# Patient Record
Sex: FEMALE | Race: WHITE | NOT HISPANIC OR LATINO | Employment: FULL TIME | URBAN - METROPOLITAN AREA
[De-identification: names, ages, dates, MRNs, and addresses within clinical notes are randomized per-mention and may not be internally consistent; named-entity substitution may affect disease eponyms.]

---

## 2022-10-03 ENCOUNTER — HOSPITAL ENCOUNTER (EMERGENCY)
Facility: HOSPITAL | Age: 42
Discharge: HOME | End: 2022-10-03
Attending: EMERGENCY MEDICINE
Payer: MEDICAID

## 2022-10-03 VITALS
DIASTOLIC BLOOD PRESSURE: 98 MMHG | RESPIRATION RATE: 16 BRPM | HEIGHT: 67 IN | SYSTOLIC BLOOD PRESSURE: 165 MMHG | HEART RATE: 105 BPM | BODY MASS INDEX: 29.03 KG/M2 | TEMPERATURE: 99 F | OXYGEN SATURATION: 99 % | WEIGHT: 185 LBS

## 2022-10-03 DIAGNOSIS — B30.9 ACUTE VIRAL CONJUNCTIVITIS OF BOTH EYES: Primary | ICD-10-CM

## 2022-10-03 PROCEDURE — 99283 EMERGENCY DEPT VISIT LOW MDM: CPT

## 2022-10-03 RX ORDER — ERYTHROMYCIN 5 MG/G
OINTMENT OPHTHALMIC
Qty: 3.5 G | Refills: 0 | Status: SHIPPED | OUTPATIENT
Start: 2022-10-03

## 2022-10-03 NOTE — Clinical Note
"Renate Gallegoseusebio Mcmanus was seen and treated in our emergency department on 10/3/2022.  She may return to work on 10/07/2022.       If you have any questions or concerns, please don't hesitate to call.      Edy Hopper MD"

## 2022-10-04 NOTE — ED PROVIDER NOTES
Encounter Date: 10/3/2022       History     Chief Complaint   Patient presents with    Eye Problem     Itchy eyes min. Reddness wearing fake eye lashes     Patient is a 42-year-old female presenting with complaint of bilateral eye irritation and redness that started yesterday.  Patient states she was exposed to pinkeye.  Patient denies any other complaints.  Patient states she needs a work excuse.  No visual changes.  No headache.    Review of patient's allergies indicates:   Allergen Reactions    Penicillins Swelling     No past medical history on file.  No past surgical history on file.  No family history on file.     Review of Systems   Constitutional: Negative.    HENT: Negative.     Eyes:  Positive for discharge, redness and itching. Negative for photophobia and visual disturbance.   Respiratory: Negative.     Cardiovascular: Negative.    Gastrointestinal: Negative.    Musculoskeletal: Negative.    Neurological: Negative.    Psychiatric/Behavioral: Negative.       Physical Exam     Initial Vitals [10/03/22 2023]   BP Pulse Resp Temp SpO2   (!) 165/98 105 16 98.5 °F (36.9 °C) 99 %      MAP       --         Physical Exam    Nursing note and vitals reviewed.  Constitutional: She appears well-developed and well-nourished.   HENT:   Head: Normocephalic.   Eyes:   Very mild conjunctival injection bilaterally.  No purulent discharge seen.  Pupil is round and reactive bilaterally.  No foreign body seen.   Cardiovascular:  Normal rate, regular rhythm and normal heart sounds.           Pulmonary/Chest: Breath sounds normal.   Musculoskeletal:         General: Normal range of motion.     Psychiatric: She has a normal mood and affect. Thought content normal.       ED Course   Procedures  Labs Reviewed - No data to display       Imaging Results    None          Medications - No data to display                           Clinical Impression:   Final diagnoses:  [B30.9] Acute viral conjunctivitis of both eyes (Primary)      ED  Disposition Condition    Discharge Stable          ED Prescriptions       Medication Sig Dispense Start Date End Date Auth. Provider    erythromycin (ROMYCIN) ophthalmic ointment Place a 1/2 inch ribbon of ointment into the lower eyelid. 3.5 g 10/3/2022 -- Edy Hopper MD          Follow-up Information       Follow up With Specialties Details Why Contact Info    [facility] - Emergency Dept Emergency Medicine  As needed [address] [place] [address] [id number] [phone]             Edy Hopper MD  10/03/22 2032

## 2022-12-06 ENCOUNTER — HOSPITAL ENCOUNTER (EMERGENCY)
Facility: HOSPITAL | Age: 42
Discharge: HOME | End: 2022-12-06
Attending: STUDENT IN AN ORGANIZED HEALTH CARE EDUCATION/TRAINING PROGRAM
Payer: MEDICAID

## 2022-12-06 VITALS
HEART RATE: 97 BPM | HEIGHT: 61 IN | TEMPERATURE: 98 F | BODY MASS INDEX: 32.1 KG/M2 | SYSTOLIC BLOOD PRESSURE: 138 MMHG | OXYGEN SATURATION: 98 % | RESPIRATION RATE: 20 BRPM | WEIGHT: 170 LBS | DIASTOLIC BLOOD PRESSURE: 89 MMHG

## 2022-12-06 DIAGNOSIS — L30.9 DERMATITIS: ICD-10-CM

## 2022-12-06 DIAGNOSIS — R21 RASH: Primary | ICD-10-CM

## 2022-12-06 LAB — B-HCG SERPL QL: NEGATIVE

## 2022-12-06 PROCEDURE — 81025 URINE PREGNANCY TEST: CPT | Performed by: STUDENT IN AN ORGANIZED HEALTH CARE EDUCATION/TRAINING PROGRAM

## 2022-12-06 PROCEDURE — 99283 EMERGENCY DEPT VISIT LOW MDM: CPT

## 2022-12-06 RX ORDER — TRIAMCINOLONE ACETONIDE 1 MG/G
CREAM TOPICAL 2 TIMES DAILY
Qty: 80 G | Refills: 0 | Status: SHIPPED | OUTPATIENT
Start: 2022-12-06

## 2022-12-06 NOTE — DISCHARGE INSTRUCTIONS
You were seen in the emergency department for your rash.  This rash is likely caused by dermatitis, or skin irritation.  Please follow-up with your primary care physician as soon as possible.  Please take all medications as prescribed.  Please return immediately to emergency department if you experience fevers above 100.4° F, fainting, seizures, chest pain, shortness of breath, uncontrollable nausea and vomiting, significantly worsening rash, skin peeling, or inability to walk.

## 2022-12-06 NOTE — ED PROVIDER NOTES
Encounter Date: 12/6/2022       History     Chief Complaint   Patient presents with    Rash     Rash noted to lower half of face and neck started 3 days ago     Patient is a pleasant 42-year-old female with no significant past medical history who presents to emergency department with rash.  History primarily obtained from patient at bedside.  Patient states she noticed some redness and itchiness to her face and neck that started 3 days ago.  Patient states it was slightly progressed since then.  Patient states she is taking Benadryl at home with no significant relief of symptoms.  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  Patient states she does not take any daily medications, and has not changed any soaps or detergents recently.  Patient denies any fevers, skin peeling, neck pain, difficulty swallowing, shortness of breath, chest pain, or inability to walk.      Review of patient's allergies indicates:   Allergen Reactions    Penicillins Swelling     History reviewed. No pertinent past medical history.  History reviewed. No pertinent surgical history.  History reviewed. No pertinent family history.  Social History     Tobacco Use    Smoking status: Never    Smokeless tobacco: Never   Substance Use Topics    Alcohol use: Never    Drug use: Never     Review of Systems   Constitutional:  Negative for fever.   HENT:  Negative for sore throat.    Respiratory:  Negative for shortness of breath.    Cardiovascular:  Negative for chest pain.   Gastrointestinal:  Negative for nausea.   Genitourinary:  Negative for dysuria.   Musculoskeletal:  Negative for back pain.   Skin:  Positive for rash.   Neurological:  Negative for weakness.   Hematological:  Does not bruise/bleed easily.     Physical Exam     Initial Vitals [12/06/22 1525]   BP Pulse Resp Temp SpO2   (!) 157/101 97 20 97.9 °F (36.6 °C) 97 %      MAP       --         Physical Exam    Constitutional: She appears well-developed and  well-nourished. She is not diaphoretic. No distress.   HENT:   Head: Normocephalic and atraumatic.   Mouth/Throat: Oropharynx is clear and moist. No oropharyngeal exudate.   Eyes: Conjunctivae and EOM are normal. Pupils are equal, round, and reactive to light. Right eye exhibits no discharge. Left eye exhibits no discharge.   Neck: Neck supple.   Normal range of motion.  Cardiovascular:  Normal rate, regular rhythm, normal heart sounds and intact distal pulses.           Pulmonary/Chest: Breath sounds normal. She exhibits no tenderness.   Abdominal: Abdomen is soft. She exhibits no distension. There is no abdominal tenderness.   Musculoskeletal:         General: No tenderness or edema. Normal range of motion.      Cervical back: Normal range of motion and neck supple.     Neurological: She is alert and oriented to person, place, and time. She has normal strength. No cranial nerve deficit or sensory deficit.   Skin: Skin is warm and dry. Capillary refill takes less than 2 seconds. Rash noted. There is erythema.   Mildly erythematous rash to chin, nasal bridge, and submandibular skin.  Scattered non purulent pustules over rash.  No skin peeling.  No significant tenderness to palpation over rash. No rashes or petechiae noted to back, chest, abdomen, or other extremities        ED Course   Procedures  Labs Reviewed   PREGNANCY TEST, URINE RAPID - Normal          Imaging Results    None          Medications - No data to display  Medical Decision Making:   Initial Assessment:   42-year-old female with no significant past medical history who presents to emergency department with rash.  Initial physical exam significant for well-appearing, nontoxic appearing patient with erythematous rash noted to chin and nasal bridge with no skin peeling or significant tenderness to palpation.  Differential Diagnosis:   Treviño Carrillo syndrome, TEN, dermatitis, allergic reaction, HSV  Clinical Tests:   Lab Tests: Ordered and  Reviewed  The following lab test(s) were unremarkable: UPT  ED Management:  Patient presentation appears consistent with dermatitis. Suspect contact dermatitis secondary to mask usage, patient's rash distribution appears to be along typical facial mask borders. No other rashes noted. Patient is very well appearing with no skin peeling, no recent medication or soap changes. No evidence of any bacterial infection or vesicles consistent with HSV infection. Patient is stable for discharge to home. Patient counseled on follow up with primary care physician. Patient counseled on strict return precautions. Patient counseled on symptom control at home, received prescription for Triamcinolone cream. Patient updated on lab work, and is amenable with being discharged home.            ED Course as of 12/06/22 1821   Tue Dec 06, 2022   1553 Preg Test, Ur: Negative [SM]      ED Course User Index  [SM] Marcos Delgado MD                 Clinical Impression:   Final diagnoses:  [R21] Rash (Primary)  [L30.9] Dermatitis      ED Disposition Condition    Discharge Stable          ED Prescriptions       Medication Sig Dispense Start Date End Date Auth. Provider    triamcinolone acetonide 0.1% (KENALOG) 0.1 % cream Apply topically 2 (two) times daily. Apply to face and neck 80 g 12/6/2022 -- Marcos Delgado MD          Follow-up Information       Follow up With Specialties Details Why Contact Taran Martinez DO Family Medicine, Hospitalist Schedule an appointment as soon as possible for a visit in 2 days  [address] [facility] [address]  [phone]               Marcos Delgado MD  12/06/22 1821

## 2023-01-19 ENCOUNTER — HOSPITAL ENCOUNTER (EMERGENCY)
Facility: HOSPITAL | Age: 43
Discharge: HOME | End: 2023-01-19
Attending: GENERAL PRACTICE
Payer: MEDICAID

## 2023-01-19 VITALS
WEIGHT: 165 LBS | HEIGHT: 61 IN | OXYGEN SATURATION: 99 % | RESPIRATION RATE: 20 BRPM | BODY MASS INDEX: 31.15 KG/M2 | HEART RATE: 81 BPM | DIASTOLIC BLOOD PRESSURE: 68 MMHG | SYSTOLIC BLOOD PRESSURE: 118 MMHG | TEMPERATURE: 98 F

## 2023-01-19 DIAGNOSIS — F15.10 AMPHETAMINE ABUSE: Primary | ICD-10-CM

## 2023-01-19 DIAGNOSIS — F12.188 CANNABIS ABUSE WITH OTHER CANNABIS-INDUCED DISORDER: ICD-10-CM

## 2023-01-19 DIAGNOSIS — R11.2 NAUSEA & VOMITING: ICD-10-CM

## 2023-01-19 DIAGNOSIS — K52.9 GASTROENTERITIS: ICD-10-CM

## 2023-01-19 LAB
ALBUMIN SERPL-MCNC: 3.5 G/DL (ref 3.5–5)
ALBUMIN/GLOB SERPL: 1.1 RATIO (ref 1.1–2)
ALP SERPL-CCNC: 103 UNIT/L (ref 40–150)
ALT SERPL-CCNC: 10 UNIT/L (ref 0–55)
AMPHET UR QL SCN: POSITIVE
AST SERPL-CCNC: 13 UNIT/L (ref 5–34)
B-HCG SERPL QL: NEGATIVE
BARBITURATE SCN PRESENT UR: NEGATIVE
BASOPHILS # BLD AUTO: 0.04 X10(3)/MCL (ref 0–0.2)
BASOPHILS NFR BLD AUTO: 0.4 %
BENZODIAZ UR QL SCN: NEGATIVE
BILIRUBIN DIRECT+TOT PNL SERPL-MCNC: 0.4 MG/DL
BUN SERPL-MCNC: 15 MG/DL (ref 7–18.7)
CALCIUM SERPL-MCNC: 9.1 MG/DL (ref 8.4–10.2)
CANNABINOIDS UR QL SCN: POSITIVE
CHLORIDE SERPL-SCNC: 109 MMOL/L (ref 98–107)
CO2 SERPL-SCNC: 24 MMOL/L (ref 22–29)
COCAINE UR QL SCN: NEGATIVE
CREAT SERPL-MCNC: 0.78 MG/DL (ref 0.55–1.02)
EOSINOPHIL # BLD AUTO: 0.26 X10(3)/MCL (ref 0–0.9)
EOSINOPHIL NFR BLD AUTO: 2.8 %
ERYTHROCYTE [DISTWIDTH] IN BLOOD BY AUTOMATED COUNT: 13.1 % (ref 11.5–17)
FENTANYL UR QL SCN: NEGATIVE
GFR SERPLBLD CREATININE-BSD FMLA CKD-EPI: >60 MLS/MIN/1.73/M2
GLOBULIN SER-MCNC: 3.3 GM/DL (ref 2.4–3.5)
GLUCOSE SERPL-MCNC: 125 MG/DL (ref 74–100)
HCT VFR BLD AUTO: 43 % (ref 37–47)
HGB BLD-MCNC: 14.3 GM/DL (ref 12–16)
IMM GRANULOCYTES # BLD AUTO: 0.01 X10(3)/MCL (ref 0–0.04)
IMM GRANULOCYTES NFR BLD AUTO: 0.1 %
LYMPHOCYTES # BLD AUTO: 2.06 X10(3)/MCL (ref 0.6–4.6)
LYMPHOCYTES NFR BLD AUTO: 22.5 %
MCH RBC QN AUTO: 30 PG
MCHC RBC AUTO-ENTMCNC: 33.3 MG/DL (ref 33–36)
MCV RBC AUTO: 90.3 FL (ref 80–94)
MDMA UR QL SCN: NEGATIVE
MONOCYTES # BLD AUTO: 0.72 X10(3)/MCL (ref 0.1–1.3)
MONOCYTES NFR BLD AUTO: 7.9 %
NEUTROPHILS # BLD AUTO: 6.06 X10(3)/MCL (ref 2.1–9.2)
NEUTROPHILS NFR BLD AUTO: 66.3 %
NRBC BLD AUTO-RTO: 0 %
OPIATES UR QL SCN: NEGATIVE
PCP UR QL: NEGATIVE
PH UR: 5.5 [PH] (ref 3–11)
PLATELET # BLD AUTO: 315 X10(3)/MCL (ref 130–400)
PMV BLD AUTO: 9.3 FL (ref 7.4–10.4)
POTASSIUM SERPL-SCNC: 3.7 MMOL/L (ref 3.5–5.1)
PROT SERPL-MCNC: 6.8 GM/DL (ref 6.4–8.3)
RBC # BLD AUTO: 4.76 X10(6)/MCL (ref 4.2–5.4)
SODIUM SERPL-SCNC: 143 MMOL/L (ref 136–145)
WBC # SPEC AUTO: 9.2 X10(3)/MCL (ref 4.5–11.5)

## 2023-01-19 PROCEDURE — 85025 COMPLETE CBC W/AUTO DIFF WBC: CPT | Performed by: GENERAL PRACTICE

## 2023-01-19 PROCEDURE — 25000003 PHARM REV CODE 250: Performed by: GENERAL PRACTICE

## 2023-01-19 PROCEDURE — 81025 URINE PREGNANCY TEST: CPT | Performed by: GENERAL PRACTICE

## 2023-01-19 PROCEDURE — 80307 DRUG TEST PRSMV CHEM ANLYZR: CPT | Performed by: GENERAL PRACTICE

## 2023-01-19 PROCEDURE — 99284 EMERGENCY DEPT VISIT MOD MDM: CPT | Mod: 25

## 2023-01-19 PROCEDURE — 80053 COMPREHEN METABOLIC PANEL: CPT | Performed by: GENERAL PRACTICE

## 2023-01-19 RX ORDER — ONDANSETRON 4 MG/1
4 TABLET, FILM COATED ORAL EVERY 6 HOURS
Qty: 12 TABLET | Refills: 0 | Status: SHIPPED | OUTPATIENT
Start: 2023-01-19

## 2023-01-19 RX ORDER — ONDANSETRON 4 MG/1
4 TABLET, ORALLY DISINTEGRATING ORAL
Status: COMPLETED | OUTPATIENT
Start: 2023-01-19 | End: 2023-01-19

## 2023-01-19 RX ADMIN — ONDANSETRON 4 MG: 4 TABLET, ORALLY DISINTEGRATING ORAL at 11:01

## 2023-01-19 NOTE — Clinical Note
"Renate Gallegosa" Yonathan was seen and treated in our emergency department on 1/19/2023.  She may return to work on 01/19/2023.  Seen by Dr. Hayes on 1/19/2023 at [phone]     If you have any questions or concerns, please don't hesitate to call.      Albert DE LA CRUZ    "

## 2023-01-19 NOTE — ED PROVIDER NOTES
Encounter Date: 1/19/2023       History     Chief Complaint   Patient presents with    Vomiting    Nausea    Diarrhea     Nausea, vomiting, diarrhea x 1 day.       Nausea and vomiting for 1 day    The history is provided by the patient.   Vomiting   This is a new problem. The current episode started today. The problem occurs intermittently. The problem has been waxing and waning. The emesis has an appearance of stomach contents. Associated symptoms include diarrhea.   Nausea  This is a new problem. The current episode started 6 to 12 hours ago. The problem occurs constantly. The problem has not changed since onset.Nothing aggravates the symptoms.   Diarrhea   Associated symptoms include vomiting.   Review of patient's allergies indicates:   Allergen Reactions    Penicillins Swelling     No past medical history on file.  No past surgical history on file.  No family history on file.  Social History     Tobacco Use    Smoking status: Never    Smokeless tobacco: Never   Substance Use Topics    Alcohol use: Never    Drug use: Never     Review of Systems   Constitutional: Negative.    HENT: Negative.     Eyes: Negative.    Respiratory: Negative.     Cardiovascular: Negative.    Gastrointestinal:  Positive for diarrhea, nausea and vomiting.   Endocrine: Negative.    Genitourinary: Negative.    Musculoskeletal: Negative.    Skin: Negative.    Allergic/Immunologic: Negative.    Neurological: Negative.    Hematological: Negative.    Psychiatric/Behavioral: Negative.     All other systems reviewed and are negative.    Physical Exam     Initial Vitals [01/19/23 1149]   BP Pulse Resp Temp SpO2   (!) 137/99 83 20 97.7 °F (36.5 °C) 99 %      MAP       --         Physical Exam    Nursing note and vitals reviewed.  Constitutional: She appears well-developed and well-nourished.   HENT:   Head: Normocephalic and atraumatic.   Eyes: EOM are normal. Pupils are equal, round, and reactive to light.   Neck: Neck supple.   Normal range of  motion.  Cardiovascular:  Normal rate, regular rhythm, normal heart sounds and intact distal pulses.           Pulmonary/Chest: Breath sounds normal.   Abdominal: Abdomen is soft. Bowel sounds are normal.   Musculoskeletal:         General: Normal range of motion.      Cervical back: Normal range of motion and neck supple.     Neurological: She is alert and oriented to person, place, and time. She has normal strength. GCS score is 15. GCS eye subscore is 4. GCS verbal subscore is 5. GCS motor subscore is 6.   Skin: Skin is warm and dry.   Psychiatric: She has a normal mood and affect. Her behavior is normal. Judgment and thought content normal.       ED Course   Procedures  Labs Reviewed   DRUG SCREEN, URINE (BEAKER) - Abnormal; Notable for the following components:       Result Value    Amphetamines, Urine Positive (*)     Cannabinoids, Urine Positive (*)     All other components within normal limits    Narrative:     Cut off concentrations:    Amphetamines - 1000 ng/ml  Barbiturates - 200 ng/ml  Benzodiazepine - 200 ng/ml  Cannabinoids (THC) - 50 ng/ml  Cocaine - 300 ng/ml  Fentanyl - 1.0 ng/ml  MDMA - 500 ng/ml  Opiates - 300 ng/ml   Phencyclidine (PCP) - 25 ng/ml    Specimen submitted for drug analysis and tested for pH and specific gravity in order to evaluate sample integrity. Suspect tampering if specific gravity is <1.003 and/or pH is not within the range of 4.5 - 8.0  False negatives may result form substances such as bleach added to urine.  False positives may result for the presence of a substance with similar chemical structure to the drug or its metabolite.    This test provides only a PRELIMINARY analytical test result. A more specific alternate chemical method must be used in order to obtain a confirmed analytical result. Gas chromatography/mass spectrometry (GC/MS) is the preferred confirmatory method. Other chemical confirmation methods are available. Clinical consideration and professional  judgement should be applied to any drug of abuse test result, particularly when preliminary positive results are used.    Positive results will be confirmed only at the physicians request. Unconfirmed screening results are to be used only for medical purposes (treatment).        COMPREHENSIVE METABOLIC PANEL - Abnormal; Notable for the following components:    Chloride 109 (*)     Glucose Level 125 (*)     All other components within normal limits   PREGNANCY TEST, URINE RAPID - Normal   CBC W/ AUTO DIFFERENTIAL    Narrative:     The following orders were created for panel order CBC auto differential.  Procedure                               Abnormality         Status                     ---------                               -----------         ------                     CBC with Differential[[id number]]                            Final result                 Please view results for these tests on the individual orders.   CBC WITH DIFFERENTIAL          Imaging Results              X-Ray Abdomen Flat And Erect (Final result)  Result time 01/19/23 12:46:31      Final result by Rebel Stacy MD (01/19/23 12:46:31)                   Impression:      Nonobstructive bowel gas pattern.      Electronically signed by: Rebel Stacy  Date:    01/19/2023  Time:    12:46               Narrative:    EXAMINATION:  XR ABDOMEN FLAT AND ERECT    CLINICAL HISTORY:  Nausea with vomiting, unspecified    TECHNIQUE:  Flat and erect AP views of the abdomen.    COMPARISON:  CT 02/14/2021    FINDINGS:  Clear lung bases.  No dilated bowel, significant air-fluid levels or definitive findings for pneumoperitoneum.  Moderate volume stool.                                       Medications   ondansetron disintegrating tablet 4 mg (4 mg Oral Given 1/19/23 1148)     Medical Decision Making:   Clinical Tests:   Lab Tests: Ordered and Reviewed  Radiological Study: Ordered and Reviewed  ED Management:  Normal exam with normal workup.  Patient  tested positive for amphetamines and cannabis.  I advised the patient that over use of cannabis can result in vomiting.  Patient states that her grandchild is sick at home with similar symptoms.  Upon arrival to the emergency room and upon discharge, the patient asked for a work excuse.                        Clinical Impression:   Final diagnoses:  [R11.2] Nausea & vomiting  [F15.10] Amphetamine abuse (Primary)  [F12.188] Cannabis abuse with other cannabis-induced disorder  [K52.9] Gastroenteritis        ED Disposition Condition    Discharge Stable          ED Prescriptions       Medication Sig Dispense Start Date End Date Auth. Provider    ondansetron (ZOFRAN) 4 MG tablet Take 1 tablet (4 mg total) by mouth every 6 (six) hours. 12 tablet 1/19/2023 -- Manish Mo MD          Follow-up Information       Follow up With Specialties Details Why Contact Info    PCP  Schedule an appointment as soon as possible for a visit in 2 days As needed              Manish Mo MD  01/19/23 4763       Manish Mo MD  02/02/23 9237

## 2023-01-19 NOTE — Clinical Note
"Renate Gallegosa" Yonathan was seen and treated in our emergency department on 1/19/2023.  She may return to work on 01/19/2023.  Seen by Dr. Hayes on 1/19/2023 at [phone]     If you have any questions or concerns, please don't hesitate to call.      Albert DE LA CRUZ    "

## 2023-01-19 NOTE — Clinical Note
"Renate Dobbins" Ronniebarry was seen and treated in our emergency department on 1/19/2023.  She may return to school on 01/19/2023.      If you have any questions or concerns, please don't hesitate to call.       RN"

## 2023-01-19 NOTE — ED NOTES
Pt ambulated to ed rm 4 from [facility]. Aaox4. Reports nausea, vomiting, and diarrhea since yesterday. Pt denies any fever or abd pain. Pt reports a burning sensation to abd area. Pt is in no distress at this time. On monitors. Wctm

## 2024-03-20 ENCOUNTER — HOSPITAL ENCOUNTER (EMERGENCY)
Facility: HOSPITAL | Age: 44
Discharge: HOME | End: 2024-03-20
Attending: FAMILY MEDICINE
Payer: MEDICAID

## 2024-03-20 VITALS
RESPIRATION RATE: 20 BRPM | OXYGEN SATURATION: 97 % | SYSTOLIC BLOOD PRESSURE: 128 MMHG | HEART RATE: 84 BPM | HEIGHT: 62 IN | WEIGHT: 175 LBS | BODY MASS INDEX: 32.2 KG/M2 | DIASTOLIC BLOOD PRESSURE: 78 MMHG | TEMPERATURE: 97 F

## 2024-03-20 DIAGNOSIS — H10.33 ACUTE BACTERIAL CONJUNCTIVITIS OF BOTH EYES: Primary | ICD-10-CM

## 2024-03-20 PROCEDURE — 99283 EMERGENCY DEPT VISIT LOW MDM: CPT

## 2024-03-20 RX ORDER — GENTAMICIN SULFATE 3 MG/ML
2 SOLUTION/ DROPS OPHTHALMIC 4 TIMES DAILY
Qty: 15 ML | Refills: 0 | Status: SHIPPED | OUTPATIENT
Start: 2024-03-20 | End: 2024-03-25

## 2024-03-20 NOTE — Clinical Note
"Renate Gallegoseusebio Mcmanus was seen and treated in our emergency department on 3/20/2024.  She may return to work on 03/22/2024.       If you have any questions or concerns, please don't hesitate to call.      Noman Lerma MD"

## 2024-03-20 NOTE — ED PROVIDER NOTES
Encounter Date: 3/20/2024       History     Chief Complaint   Patient presents with    Conjunctivitis     Eye irritation to both eyes started yesterday, exposure to pink eye.     Patient presents to the emergency room with bilateral eyes with irritation.  Itchy, watery, red.    The history is provided by the patient.     Review of patient's allergies indicates:   Allergen Reactions    Penicillins Swelling     History reviewed. No pertinent past medical history.  History reviewed. No pertinent surgical history.  History reviewed. No pertinent family history.  Social History     Tobacco Use    Smoking status: Never    Smokeless tobacco: Never   Substance Use Topics    Alcohol use: Never    Drug use: Never     Review of Systems   Eyes:  Positive for redness and itching.   All other systems reviewed and are negative.      Physical Exam     Initial Vitals [03/20/24 1744]   BP Pulse Resp Temp SpO2   128/78 84 20 97 °F (36.1 °C) 97 %      MAP       --         Physical Exam    Nursing note and vitals reviewed.  Constitutional: She appears well-developed and well-nourished. No distress.   HENT:   Head: Normocephalic and atraumatic.   Eyes: EOM are normal. Pupils are equal, round, and reactive to light.   Bilateral conjunctival irritation and scant watery discharge noted           ED Course   Procedures  Labs Reviewed - No data to display       Imaging Results    None          Medications - No data to display  Medical Decision Making                                    Clinical Impression:  Final diagnoses:  [H10.33] Acute bacterial conjunctivitis of both eyes (Primary)          ED Disposition Condition    Discharge Stable          ED Prescriptions       Medication Sig Dispense Start Date End Date Auth. Provider    gentamicin (GARAMYCIN) 0.3 % ophthalmic solution Place 2 drops into both eyes 4 (four) times daily. for 5 days 15 mL 3/20/2024 3/25/2024 Noman Lerma MD          Follow-up Information       Follow up With  Specialties Details Why Contact Info    Your PCP  Call  As needed              Noman Lerma MD  03/20/24 0308

## 2025-05-17 ENCOUNTER — HOSPITAL ENCOUNTER (EMERGENCY)
Facility: HOSPITAL | Age: 45
Discharge: HOME | End: 2025-05-17
Attending: FAMILY MEDICINE
Payer: MEDICAID

## 2025-05-17 VITALS
HEART RATE: 88 BPM | BODY MASS INDEX: 33.79 KG/M2 | WEIGHT: 179 LBS | RESPIRATION RATE: 18 BRPM | DIASTOLIC BLOOD PRESSURE: 81 MMHG | SYSTOLIC BLOOD PRESSURE: 150 MMHG | OXYGEN SATURATION: 98 % | HEIGHT: 61 IN | TEMPERATURE: 98 F

## 2025-05-17 DIAGNOSIS — T14.90XA TRAUMA: ICD-10-CM

## 2025-05-17 DIAGNOSIS — S00.03XA CONTUSION OF SCALP, INITIAL ENCOUNTER: Primary | ICD-10-CM

## 2025-05-17 DIAGNOSIS — T07.XXXA MULTIPLE CONTUSIONS: ICD-10-CM

## 2025-05-17 PROCEDURE — 99284 EMERGENCY DEPT VISIT MOD MDM: CPT | Mod: 25

## 2025-05-17 PROCEDURE — 25000003 PHARM REV CODE 250: Performed by: FAMILY MEDICINE

## 2025-05-17 RX ORDER — HYDROCODONE BITARTRATE AND ACETAMINOPHEN 10; 325 MG/1; MG/1
1 TABLET ORAL
Refills: 0 | Status: COMPLETED | OUTPATIENT
Start: 2025-05-17 | End: 2025-05-17

## 2025-05-17 RX ORDER — HYDROCODONE BITARTRATE AND ACETAMINOPHEN 10; 325 MG/1; MG/1
1 TABLET ORAL EVERY 6 HOURS PRN
Qty: 10 TABLET | Refills: 0 | Status: SHIPPED | OUTPATIENT
Start: 2025-05-17

## 2025-05-17 RX ORDER — CLINDAMYCIN PHOSPHATE 900 MG/50ML
900 INJECTION, SOLUTION INTRAVENOUS
Status: DISCONTINUED | OUTPATIENT
Start: 2025-05-17 | End: 2025-05-17

## 2025-05-17 RX ADMIN — HYDROCODONE BITARTRATE AND ACETAMINOPHEN 1 TABLET: 10; 325 TABLET ORAL at 08:05

## 2025-05-18 NOTE — ED PROVIDER NOTES
"Encounter Date: 5/17/2025       History     Chief Complaint   Patient presents with    Arm Injury     C/o right arm and right knee pain with a posterior headache after wooden utility pole fell on pt while she was working in her yard approx 30 min pta. No redness nor deformities noted to areas. ROM intact. Pain to right arm and knee is a burning type pain 10/10 and headache pain 10/10. No meds taken for pain pta. AAOx4. Cap refill < 3. Pulses +2.     Patient presented to the emergency room after being struck by a small pole which fell on her right side.  She reports that it did hit her on the right side of her body, including her head chest right forearm and right knee.  She reports she possibly was knocked out" because she can remember calling for help while she was unconscious.  There was no nausea or vomiting.        Review of patient's allergies indicates:   Allergen Reactions    Penicillins Swelling     History reviewed. No pertinent past medical history.  Past Surgical History:   Procedure Laterality Date    TUBAL LIGATION       No family history on file.  Social History[1]  Review of Systems   Constitutional:  Negative for fever.   HENT:  Negative for sore throat.    Respiratory:  Negative for shortness of breath.    Cardiovascular:  Negative for chest pain.   Gastrointestinal:  Negative for nausea.   Genitourinary:  Negative for dysuria.   Musculoskeletal:  Negative for back pain.   Skin:  Negative for rash.   Neurological:  Negative for weakness.   Hematological:  Does not bruise/bleed easily.   All other systems reviewed and are negative.      Physical Exam     Initial Vitals [05/17/25 1849]   BP Pulse Resp Temp SpO2   (!) 150/81 88 16 97.8 °F (36.6 °C) 98 %      MAP       --         Physical Exam    Nursing note and vitals reviewed.  Constitutional: She appears well-developed and well-nourished. No distress.   HENT:   Head: Normocephalic. Mouth/Throat: No oropharyngeal exudate.   Mild tender right scalp, " no hematoma nor abrasion.   Eyes: Conjunctivae and EOM are normal. Pupils are equal, round, and reactive to light.   Neck: Neck supple.   Non tender.   Normal range of motion.  Cardiovascular:  Normal rate, regular rhythm and normal heart sounds.           Pulmonary/Chest: Breath sounds normal. No respiratory distress. She exhibits tenderness (Mild right upper chest wall tenderness).   Abdominal: Abdomen is soft. Bowel sounds are normal. There is no abdominal tenderness.   Musculoskeletal:         General: No edema. Normal range of motion.      Cervical back: Normal range of motion and neck supple.      Comments: Right forearm: mild tender, no abrasion, no edema full rom, distal cap refill <3 sec, sensation intact.    Right shoulder: non-tender, full ROM    Right knee: mild diffusely tender, no effusion, stable. No abrasions.     Lymphadenopathy:     She has no cervical adenopathy.   Neurological: She is alert and oriented to person, place, and time. She has normal strength and normal reflexes. She displays normal reflexes. No cranial nerve deficit or sensory deficit. GCS score is 15. GCS eye subscore is 4. GCS verbal subscore is 5. GCS motor subscore is 6.   Skin: Skin is warm and dry. Capillary refill takes less than 2 seconds.   Psychiatric: She has a normal mood and affect.         ED Course   Procedures  Labs Reviewed - No data to display       Imaging Results              CT Head Without Contrast (Final result)  Result time 05/17/25 20:17:58      Final result by Maxwell Gregory MD (05/17/25 20:17:58)                   Impression:      1.  No acute intracranial findings identified.    2.  Sinusitis changes.      Electronically signed by: Maxwell Gregory  Date:    05/17/2025  Time:    20:17               Narrative:    EXAMINATION:  CT HEAD WITHOUT CONTRAST    CLINICAL HISTORY:  Head trauma, moderate-severe;    TECHNIQUE:  Sequential axial images were performed of the brain without contrast.    Dose product length  of 763 mGycm. Automated exposure control was utilized to minimize radiation dose.    COMPARISON:  None available    FINDINGS:  Gray-white matter differentiation is unremarkable.  There is no intracranial mass effect, midline shift, hydrocephalus or hemorrhage. There is no sulcal effacement or low attenuation changes to suggest recent large vessel territory infarction.  There is no acute extra axial fluid collection.  There is no acute depressed skull fracture.    There is marked mucoperiosteal thickening of the right maxillary and bilateral ethmoidal air cells and the right frontal sinus.  There is also mild mucoperiosteal thickening of the sphenoid sinuses.  Mastoid air cells aeration is optimal.                                       X-Ray Chest AP Portable (Preliminary result)  Result time 05/17/25 20:29:34      Wet Read by Noman Lerma MD (05/17/25 20:29:34, [facility], Emergency Medicine)    No acute                                     X-Ray Knee 3 View Right (Preliminary result)  Result time 05/17/25 20:30:11      Wet Read by Noman Lerma MD (05/17/25 20:30:11, [facility]gency [facility], Emergency Medicine)    No acute                                     X-Ray Forearm Right (Preliminary result)  Result time 05/17/25 20:30:49      Wet Read by Noman Lerma MD (05/17/25 20:30:49, [facility]gency [facility], Emergency Medicine)    No acute                                     Medications   HYDROcodone-acetaminophen  mg per tablet 1 tablet (has no administration in time range)     Medical Decision Making  Amount and/or Complexity of Data Reviewed  Radiology: ordered and independent interpretation performed.    Risk  Prescription drug management.                                      Clinical Impression:  Final diagnoses:  [T14.90XA] Trauma  [S00.03XA] Contusion of scalp, initial encounter (Primary)  [T07.XXXA] Multiple contusions          ED Disposition  Condition    Discharge Stable          ED Prescriptions       Medication Sig Dispense Start Date End Date Auth. Provider    HYDROcodone-acetaminophen (NORCO)  mg per tablet Take 1 tablet by mouth every 6 (six) hours as needed for Pain. 10 tablet 5/17/2025 -- Noman Lerma MD          Follow-up Information       Follow up With Specialties Details Why Contact Info    Your PCP  Call  As needed                  [1]   Social History  Tobacco Use    Smoking status: Never    Smokeless tobacco: Never   Substance Use Topics    Alcohol use: Never    Drug use: Never        Noman Lerma MD  05/17/25 2042

## 2025-08-26 ENCOUNTER — HOSPITAL ENCOUNTER (EMERGENCY)
Facility: HOSPITAL | Age: 45
Discharge: HOME | End: 2025-08-26
Attending: FAMILY MEDICINE